# Patient Record
Sex: MALE | Race: WHITE | Employment: FULL TIME | URBAN - METROPOLITAN AREA
[De-identification: names, ages, dates, MRNs, and addresses within clinical notes are randomized per-mention and may not be internally consistent; named-entity substitution may affect disease eponyms.]

---

## 2019-03-05 ENCOUNTER — OFFICE VISIT (OUTPATIENT)
Dept: FAMILY MEDICINE CLINIC | Age: 24
End: 2019-03-05
Payer: COMMERCIAL

## 2019-03-05 VITALS
BODY MASS INDEX: 23.72 KG/M2 | TEMPERATURE: 98.2 F | HEIGHT: 73 IN | SYSTOLIC BLOOD PRESSURE: 130 MMHG | HEART RATE: 66 BPM | DIASTOLIC BLOOD PRESSURE: 78 MMHG | RESPIRATION RATE: 16 BRPM | WEIGHT: 179 LBS

## 2019-03-05 DIAGNOSIS — Z00.00 WELLNESS EXAMINATION: Primary | ICD-10-CM

## 2019-03-05 DIAGNOSIS — R68.82 LOW LIBIDO: ICD-10-CM

## 2019-03-05 LAB
ALBUMIN SERPL-MCNC: 5.3 G/DL (ref 3.5–5.1)
ALP BLD-CCNC: 97 U/L (ref 38–126)
ALT SERPL-CCNC: 12 U/L (ref 11–66)
ANION GAP SERPL CALCULATED.3IONS-SCNC: 16 MEQ/L (ref 8–16)
AST SERPL-CCNC: 18 U/L (ref 5–40)
BASOPHILS # BLD: 0.5 %
BASOPHILS ABSOLUTE: 0 THOU/MM3 (ref 0–0.1)
BILIRUB SERPL-MCNC: 0.9 MG/DL (ref 0.3–1.2)
BILIRUBIN DIRECT: < 0.2 MG/DL (ref 0–0.3)
BUN BLDV-MCNC: 19 MG/DL (ref 7–22)
CALCIUM SERPL-MCNC: 10.3 MG/DL (ref 8.5–10.5)
CHLORIDE BLD-SCNC: 97 MEQ/L (ref 98–111)
CO2: 25 MEQ/L (ref 23–33)
CREAT SERPL-MCNC: 1 MG/DL (ref 0.4–1.2)
EOSINOPHIL # BLD: 0.9 %
EOSINOPHILS ABSOLUTE: 0.1 THOU/MM3 (ref 0–0.4)
ERYTHROCYTE [DISTWIDTH] IN BLOOD BY AUTOMATED COUNT: 12.7 % (ref 11.5–14.5)
ERYTHROCYTE [DISTWIDTH] IN BLOOD BY AUTOMATED COUNT: 39.8 FL (ref 35–45)
FOLATE: > 20 NG/ML (ref 4.8–24.2)
GFR SERPL CREATININE-BSD FRML MDRD: > 90 ML/MIN/1.73M2
GLUCOSE BLD-MCNC: 95 MG/DL (ref 70–108)
HCT VFR BLD CALC: 47.4 % (ref 42–52)
HEMOGLOBIN: 16.6 GM/DL (ref 14–18)
IMMATURE GRANS (ABS): 0.01 THOU/MM3 (ref 0–0.07)
IMMATURE GRANULOCYTES: 0.2 %
LYMPHOCYTES # BLD: 26.4 %
LYMPHOCYTES ABSOLUTE: 1.7 THOU/MM3 (ref 1–4.8)
MCH RBC QN AUTO: 30.6 PG (ref 26–33)
MCHC RBC AUTO-ENTMCNC: 35 GM/DL (ref 32.2–35.5)
MCV RBC AUTO: 87.3 FL (ref 80–94)
MONOCYTES # BLD: 5.9 %
MONOCYTES ABSOLUTE: 0.4 THOU/MM3 (ref 0.4–1.3)
NUCLEATED RED BLOOD CELLS: 0 /100 WBC
PLATELET # BLD: 211 THOU/MM3 (ref 130–400)
PMV BLD AUTO: 9.4 FL (ref 9.4–12.4)
POTASSIUM SERPL-SCNC: 3.8 MEQ/L (ref 3.5–5.2)
RBC # BLD: 5.43 MILL/MM3 (ref 4.7–6.1)
SEG NEUTROPHILS: 66.1 %
SEGMENTED NEUTROPHILS ABSOLUTE COUNT: 4.4 THOU/MM3 (ref 1.8–7.7)
SODIUM BLD-SCNC: 138 MEQ/L (ref 135–145)
T4 FREE: 1.45 NG/DL (ref 0.93–1.76)
TOTAL PROTEIN: 8 G/DL (ref 6.1–8)
TSH SERPL DL<=0.05 MIU/L-ACNC: 2.89 UIU/ML (ref 0.4–4.2)
VITAMIN B-12: 646 PG/ML (ref 211–911)
WBC # BLD: 6.6 THOU/MM3 (ref 4.8–10.8)

## 2019-03-05 PROCEDURE — 99385 PREV VISIT NEW AGE 18-39: CPT | Performed by: NURSE PRACTITIONER

## 2019-03-05 ASSESSMENT — ENCOUNTER SYMPTOMS
VOMITING: 0
ABDOMINAL PAIN: 0
RHINORRHEA: 0
EYE DISCHARGE: 0
NAUSEA: 0
WHEEZING: 0
SHORTNESS OF BREATH: 0
BACK PAIN: 0
EYE PAIN: 0
DIARRHEA: 0
COLOR CHANGE: 0
CONSTIPATION: 0
SORE THROAT: 0
STRIDOR: 0
COUGH: 0

## 2019-03-05 ASSESSMENT — PATIENT HEALTH QUESTIONNAIRE - PHQ9
2. FEELING DOWN, DEPRESSED OR HOPELESS: 0
SUM OF ALL RESPONSES TO PHQ QUESTIONS 1-9: 0
SUM OF ALL RESPONSES TO PHQ9 QUESTIONS 1 & 2: 0
1. LITTLE INTEREST OR PLEASURE IN DOING THINGS: 0
SUM OF ALL RESPONSES TO PHQ QUESTIONS 1-9: 0

## 2019-03-08 LAB
TESTOSTERONE TOTAL: 311 NG/DL (ref 300–1080)
VITAMIN D 1,25-DIHYDROXY: 51.7 PG/ML (ref 19.9–79.3)

## 2019-03-12 ENCOUNTER — OFFICE VISIT (OUTPATIENT)
Dept: UROLOGY | Age: 24
End: 2019-03-12
Payer: COMMERCIAL

## 2019-03-12 VITALS
BODY MASS INDEX: 23.46 KG/M2 | WEIGHT: 177 LBS | SYSTOLIC BLOOD PRESSURE: 118 MMHG | HEIGHT: 73 IN | DIASTOLIC BLOOD PRESSURE: 68 MMHG

## 2019-03-12 DIAGNOSIS — R37 SEXUAL DYSFUNCTION: Primary | ICD-10-CM

## 2019-03-12 DIAGNOSIS — R68.82 LOW LIBIDO: ICD-10-CM

## 2019-03-12 LAB
BILIRUBIN URINE: NEGATIVE
BLOOD URINE, POC: NEGATIVE
CHARACTER, URINE: CLEAR
COLOR, URINE: YELLOW
GLUCOSE URINE: NEGATIVE MG/DL
KETONES, URINE: NEGATIVE
LEUKOCYTE CLUMPS, URINE: NEGATIVE
NITRITE, URINE: NEGATIVE
PH, URINE: 6.5 (ref 5–9)
PROTEIN, URINE: NEGATIVE MG/DL
SPECIFIC GRAVITY, URINE: 1.01 (ref 1–1.03)
UROBILINOGEN, URINE: 0.2 EU/DL (ref 0–1)

## 2019-03-12 PROCEDURE — 81003 URINALYSIS AUTO W/O SCOPE: CPT | Performed by: NURSE PRACTITIONER

## 2019-03-12 PROCEDURE — 99203 OFFICE O/P NEW LOW 30 MIN: CPT | Performed by: NURSE PRACTITIONER

## 2019-03-12 RX ORDER — SILDENAFIL CITRATE 20 MG/1
20 TABLET ORAL DAILY PRN
Qty: 30 TABLET | Refills: 0 | Status: SHIPPED | OUTPATIENT
Start: 2019-03-12

## 2019-04-09 ENCOUNTER — OFFICE VISIT (OUTPATIENT)
Dept: FAMILY MEDICINE CLINIC | Age: 24
End: 2019-04-09
Payer: COMMERCIAL

## 2019-04-09 VITALS
BODY MASS INDEX: 23.11 KG/M2 | WEIGHT: 175.2 LBS | SYSTOLIC BLOOD PRESSURE: 136 MMHG | HEART RATE: 66 BPM | TEMPERATURE: 97.8 F | DIASTOLIC BLOOD PRESSURE: 68 MMHG | RESPIRATION RATE: 16 BRPM

## 2019-04-09 DIAGNOSIS — R68.82 LOW LIBIDO: Primary | ICD-10-CM

## 2019-04-09 PROCEDURE — 99213 OFFICE O/P EST LOW 20 MIN: CPT | Performed by: NURSE PRACTITIONER

## 2019-04-09 ASSESSMENT — ENCOUNTER SYMPTOMS
BACK PAIN: 0
WHEEZING: 0
STRIDOR: 0
SORE THROAT: 0
COUGH: 0
EYE DISCHARGE: 0
ABDOMINAL PAIN: 0
DIARRHEA: 0
SHORTNESS OF BREATH: 0
VOMITING: 0
CONSTIPATION: 0
COLOR CHANGE: 0
EYE PAIN: 0
NAUSEA: 0
RHINORRHEA: 0

## 2019-04-09 NOTE — PATIENT INSTRUCTIONS
Patient Education        Decreased Libido in Men: Care Instructions  Your Care Instructions    A decreased libido means you have less desire to have sex. It may be hard to get sexually excited or have an erection. Problems such as low testosterone can cause this. So can stress, sexual abuse, tension between you and your sex partner, and some medicines. There may be more than one cause. Your doctor may do tests to check your hormone levels. He or she may ask you about your sex life. Trust your doctor. Try to be honest about your feelings toward sex. Your sex partner may want to take part in your treatment. And you may want to learn about how the body changes as you get older. Most men can have a healthy sex drive again after the problem is found. Medicines for depression can affect your sex drive. If you are taking any, ask your doctor about changing them. Follow-up care is a key part of your treatment and safety. Be sure to make and go to all appointments, and call your doctor if you are having problems. It's also a good idea to know your test results and keep a list of the medicines you take. How can you care for yourself at home? · Be safe with medicines. Take your medicine exactly as prescribed. If your doctor prescribed antibiotics, take them as directed. Do not stop taking them just because you feel better. You need to take the full course of antibiotics. · Your doctor may prescribe a medicine to help you keep an erection. This could be a medicine such as Cialis, Levitra, or Viagra. But don't take these drugs if you take nitroglycerin or other nitrate medicine for angina. If you are taking medicine for prostate problems, ask your doctor if these erection medicines are safe. · Tell your doctor about all the medicines, vitamins, supplements, and herbal remedies you take. · Limit the amount of alcohol you drink. Alcohol can make it harder to have an erection. · Have more foreplay before sex.   · Reduce your stress before sex by doing something to help you relax. · Enjoy other types of sex besides intercourse. · Be honest with your sex partner about what you enjoy during sex. When should you call for help? Watch closely for changes in your health, and be sure to contact your doctor if you have any problems. Where can you learn more? Go to https://[facility]ila[facility]. org and sign in to your [employer] account. Enter M126 in the [employer] box to learn more about \"Decreased Libido in Men: Care Instructions. \"     If you do not have an account, please click on the \"Sign Up Now\" link. Current as of: September 26, 2018  Content Version: 11.9  © 8403-2658 [employer]. Care instructions adapted under license by [facility] ([facility]). If you have questions about a medical condition or this instruction, always ask your healthcare professional. [place] [address] any warranty or liability for your use of this information. Patient Education        Decreased Libido in Men: Care Instructions  Your Care Instructions    A decreased libido means you have less desire to have sex. It may be hard to get sexually excited or have an erection. Problems such as low testosterone can cause this. So can stress, sexual abuse, tension between you and your sex partner, and some medicines. There may be more than one cause. Your doctor may do tests to check your hormone levels. He or she may ask you about your sex life. Trust your doctor. Try to be honest about your feelings toward sex. Your sex partner may want to take part in your treatment. And you may want to learn about how the body changes as you get older. Most men can have a healthy sex drive again after the problem is found. Medicines for depression can affect your sex drive. If you are taking any, ask your doctor about changing them. Follow-up care is a key part of your treatment and safety.  Be sure to make and go to all

## 2019-04-09 NOTE — PROGRESS NOTES
[address] [facility]  [place] [address]  Dept: [phone]  Dept Fax: [phone]  Loc: Maria A Guerrier is a 25 y.o.male here for follow up regarding his recent issues with low libido and ED. He did visit urology in the past month and was given a script for Viagra as an interim treatment. Diagnosis suggested psychogenic causes. \  Patient presents today with his wife along again and stating he did not try to Viagra. He seems somewhat averse to the medication. The urologists also discussed increasing his exercise which he has also not done. They are both concerned that the patient's testosterone level was just over 300, which is the low normal.  Urology stated that patient would need to or more suboptimal levels before testosterone treatment could be started. Patient states his libido has not really changed much since his last visit here. He does wake up often with an erection so there does not seem to be a mechanical impedance. There is no problem list on file for this patient. Current Outpatient Medications   Medication Sig Dispense Refill    sildenafil (REVATIO) 20 MG tablet Take 1 tablet by mouth daily as needed (ED) 30 tablet 0     No current facility-administered medications for this visit. Review of Systems   Constitutional: Negative for activity change, appetite change, chills, fatigue and fever. HENT: Negative for congestion, ear pain, rhinorrhea and sore throat. Eyes: Negative for pain and discharge. Respiratory: Negative for cough, shortness of breath, wheezing and stridor. Cardiovascular: Negative for chest pain. Gastrointestinal: Negative for abdominal pain, constipation, diarrhea, nausea and vomiting. Genitourinary: Negative for dysuria, flank pain and frequency.         Low libido   Musculoskeletal: Negative for arthralgias, back pain, myalgias and neck pain.   Skin: Negative for color change and rash. Allergic/Immunologic: Negative for immunocompromised state. Neurological: Negative for dizziness, weakness and headaches. Psychiatric/Behavioral: Negative. OBJECTIVE     /68 (Site: Right Upper Arm)   Pulse 66   Temp 97.8 °F (36.6 °C) (Oral)   Resp 16   Wt 175 lb 3.2 oz (79.5 kg)   BMI 23.11 kg/m²     Wt Readings from Last 3 Encounters:   04/09/19 175 lb 3.2 oz (79.5 kg)   03/12/19 177 lb (80.3 kg)   03/05/19 179 lb (81.2 kg)     Body mass index is 23.11 kg/m². Physical Exam   Constitutional: He is oriented to person, place, and time. He appears well-developed and well-nourished. No distress. Eyes: Conjunctivae are normal. Right eye exhibits no discharge. Left eye exhibits no discharge. Cardiovascular: Normal rate. Pulmonary/Chest: Effort normal. No respiratory distress. Musculoskeletal: Normal range of motion. He exhibits no edema or tenderness. Neurological: He is alert and oriented to person, place, and time. Skin: Skin is warm and dry. No rash noted. He is not diaphoretic. No erythema. No pallor. Psychiatric: He has a normal mood and affect. His behavior is normal. Judgment and thought content normal.   Nursing note and vitals reviewed. No results found for: LABA1C    No results found for: CHOL, TRIG, HDL, LDLCALC, LDLDIRECT    The ASCVD Risk score (Martina Cole., et al., 2013) failed to calculate for the following reasons:     The 2013 ASCVD risk score is only valid for ages 36 to 78    Lab Results   Component Value Date     03/05/2019    K 3.8 03/05/2019    CL 97 (L) 03/05/2019    CO2 25 03/05/2019    BUN 19 03/05/2019    CREATININE 1.0 03/05/2019    GLUCOSE 95 03/05/2019    CALCIUM 10.3 03/05/2019    PROT 8.0 03/05/2019    LABALBU 5.3 (H) 03/05/2019    BILITOT 0.9 03/05/2019    ALKPHOS 97 03/05/2019    AST 18 03/05/2019    ALT 12 03/05/2019    LABGLOM >90 03/05/2019       No results found for: LABMICR, [id number]    Lab Results   Component Value Date    TSH 2.890 03/05/2019       Lab Results   Component Value Date    WBC 6.6 03/05/2019    HGB 16.6 03/05/2019    HCT 47.4 03/05/2019    MCV 87.3 03/05/2019     03/05/2019       No results found for: PSA, PSADIA      There is no immunization history on file for this patient. Health Maintenance   Topic Date Due    Varicella Vaccine (1 of 2 - 13+ 2-dose series) 01/31/2008    HIV screen  01/31/2010    DTaP/Tdap/Td vaccine (1 - Tdap) 01/31/2014    Flu vaccine (Season Ended) 09/01/2019    HPV vaccine  Aged Out    Pneumococcal 0-64 years Vaccine  Aged Out       Future Appointments   Date Time Provider Lencho Vidales   6/25/2019  3:45 PM Shay Blunt [facility] [place] [email or web address]       ASSESSMENT      Attempts at helping the patient discover possible emotional barriers are not very successful today. Patient's affect is flat and he does not really offer much information other than direct answers. Diagnosis Orders   1. Low libido         PLAN      1. After a lengthy discussion with the patient and his wife, it was agreed the patient would come back by himself in a week for a more personal discussion without his wife with him. 2.  He will go ahead and try the Viagra. 3. I will contact urology to ascertain when the next testosterone level should be drawn. 4. Patient will increase his exercise activities.       Electronically signed by JUAN J Benjamin CNP on 4/9/2019 at 3:46 PM